# Patient Record
Sex: MALE | Race: WHITE | NOT HISPANIC OR LATINO | Employment: FULL TIME | URBAN - METROPOLITAN AREA
[De-identification: names, ages, dates, MRNs, and addresses within clinical notes are randomized per-mention and may not be internally consistent; named-entity substitution may affect disease eponyms.]

---

## 2018-04-11 ENCOUNTER — TRANSCRIBE ORDERS (OUTPATIENT)
Dept: URGENT CARE | Facility: MEDICAL CENTER | Age: 48
End: 2018-04-11

## 2018-04-11 ENCOUNTER — APPOINTMENT (OUTPATIENT)
Dept: RADIOLOGY | Facility: MEDICAL CENTER | Age: 48
End: 2018-04-11
Payer: COMMERCIAL

## 2018-04-11 DIAGNOSIS — R05.9 COUGH: Primary | ICD-10-CM

## 2018-04-11 DIAGNOSIS — R05.9 COUGH: ICD-10-CM

## 2018-04-11 PROCEDURE — 71046 X-RAY EXAM CHEST 2 VIEWS: CPT

## 2019-01-04 ENCOUNTER — OFFICE VISIT (OUTPATIENT)
Dept: URGENT CARE | Facility: MEDICAL CENTER | Age: 49
End: 2019-01-04
Payer: COMMERCIAL

## 2019-01-04 ENCOUNTER — APPOINTMENT (OUTPATIENT)
Dept: RADIOLOGY | Facility: MEDICAL CENTER | Age: 49
End: 2019-01-04
Payer: COMMERCIAL

## 2019-01-04 VITALS
SYSTOLIC BLOOD PRESSURE: 114 MMHG | WEIGHT: 236 LBS | TEMPERATURE: 98.6 F | DIASTOLIC BLOOD PRESSURE: 79 MMHG | HEART RATE: 67 BPM | BODY MASS INDEX: 33.79 KG/M2 | OXYGEN SATURATION: 98 % | HEIGHT: 70 IN | RESPIRATION RATE: 20 BRPM

## 2019-01-04 DIAGNOSIS — M25.552 LEFT HIP PAIN: Primary | ICD-10-CM

## 2019-01-04 DIAGNOSIS — M25.552 LEFT HIP PAIN: ICD-10-CM

## 2019-01-04 PROCEDURE — 73502 X-RAY EXAM HIP UNI 2-3 VIEWS: CPT

## 2019-01-04 PROCEDURE — 99213 OFFICE O/P EST LOW 20 MIN: CPT | Performed by: PHYSICIAN ASSISTANT

## 2019-01-04 RX ORDER — NAPROXEN 500 MG/1
500 TABLET ORAL 2 TIMES DAILY WITH MEALS
Qty: 20 TABLET | Refills: 0 | Status: SHIPPED | OUTPATIENT
Start: 2019-01-04 | End: 2019-01-14

## 2019-01-04 RX ORDER — METHOCARBAMOL 500 MG/1
500 TABLET, FILM COATED ORAL 4 TIMES DAILY
Qty: 20 TABLET | Refills: 0 | Status: SHIPPED | OUTPATIENT
Start: 2019-01-04 | End: 2019-01-09

## 2019-01-04 NOTE — PROGRESS NOTES
[facility]        NAME: Madison Lawton is a 53 y o  male  : 1970    MRN: [id number]  DATE: 2019  TIME: 1:23 PM    Assessment and Plan   Left hip pain [M25 [id number]]  1  Left hip pain  XR hip/pelv 2-3 vws left if performed         Patient Instructions     Left hip pain  Robaxin as directed (may become drowsy)  Naprosyn twice daily  Follow up with PCP in 3-5 days  Proceed to  ER if symptoms worsen  Chief Complaint     Chief Complaint   Patient presents with    Leg Pain         History of Present Illness       52 y/o male presents c/o pain to left upper hip x 2 days  Patient states he heard a pop x 2 to hip area  Last BM today (normal)  Denies h/o trauma, fever, chills, SOB, chest pain        Review of Systems   Review of Systems   Constitutional: Negative  HENT: Negative  Eyes: Negative  Respiratory: Negative  Negative for apnea, cough, choking, chest tightness, shortness of breath, wheezing and stridor  Cardiovascular: Negative  Negative for chest pain  Musculoskeletal: Positive for arthralgias  Current Medications     No current outpatient prescriptions on file  Current Allergies     Allergies as of 2019    (No Known Allergies)            The following portions of the patient's history were reviewed and updated as appropriate: allergies, current medications, past family history, past medical history, past social history, past surgical history and problem list      Past Medical History:   Diagnosis Date    DDD (degenerative disc disease), lumbosacral     Herniated disc, cervical     Torn ACL        Past Surgical History:   Procedure Laterality Date    NASAL SEPTUM SURGERY         Family History   Problem Relation Age of Onset    No Known Problems Mother          Medications have been verified          Objective   /79   Pulse 67   Temp 98 6 °F (37 °C) (Temporal)   Resp 20   Ht 5' 10" (1 778 m)   Wt 107 kg (236 lb)   SpO2 98%   BMI 33 86 kg/m²        Physical Exam     Physical Exam   Constitutional: He appears well-developed and well-nourished  No distress  Neck: Normal range of motion  Neck supple  Cardiovascular: Normal rate, regular rhythm, normal heart sounds and intact distal pulses  Pulmonary/Chest: Effort normal and breath sounds normal  No respiratory distress  He has no wheezes  He has no rales  He exhibits no tenderness  Abdominal: Soft  Bowel sounds are normal  He exhibits no distension and no mass  There is no tenderness  There is no rebound and no guarding  Hernia confirmed negative in the right inguinal area and confirmed negative in the left inguinal area  Genitourinary: Testes normal and penis normal    Musculoskeletal:        Left hip: He exhibits no swelling and no crepitus  Legs:  Lymphadenopathy:     He has no cervical adenopathy  Right: No inguinal adenopathy present  Left: No inguinal adenopathy present  Skin: He is not diaphoretic         Xray- no fx

## 2019-01-04 NOTE — PATIENT INSTRUCTIONS
Left hip pain  Robaxin as directed (may become drowsy)  Naprosyn twice daily  Follow up with PCP in 3-5 days  Proceed to  ER if symptoms worsen  Hip Sprain   WHAT YOU NEED TO KNOW:   A hip sprain is when a ligament in your hip is stretched or torn  Ligaments (tough tissue that connects bones) surround your hip and hold it in place  DISCHARGE INSTRUCTIONS:   Medicines:   · NSAIDs  help decrease swelling, pain, and fever  This medicine is available with or without a doctor's order  NSAIDs can cause stomach bleeding or kidney problems in certain people  If you take blood thinner medicine, always ask your healthcare provider if NSAIDs are safe for you  Always read the medicine label and follow directions  · Take your medicine as directed  Contact your healthcare provider if you think your medicine is not helping or if you have side effects  Tell him if you are allergic to any medicine  Keep a list of the medicines, vitamins, and herbs you take  Include the amounts, and when and why you take them  Bring the list or the pill bottles to follow-up visits  Carry your medicine list with you in case of an emergency  Follow up with your healthcare provider as directed:  Write down any questions you have so you remember to ask them in your follow-up visits  How to care for your hip sprain:   · Rest  your hip for 2 to 3 days after your injury  This will help decrease the risk of more damage to your hip  · Apply ice  on your hip for 15 to 20 minutes every hour or as directed  Use an ice pack, or put crushed ice in a plastic bag  Cover it with a towel  Ice helps prevent tissue damage and decreases swelling and pain  You may need to apply ice to your hip at least 4 to 8 times each day for the first 48 hours  How to exercise your hip:  After you rest your hip for about 3 days, your healthcare provider may want you to exercise the hip to decrease stiffness  He may also have you start physical therapy (PT)   A physical therapist teaches you light exercises to help decrease pain and swelling and improve hip movement  Once you are able to move your hip without pain, you will be taught exercises to improve your strength  If you have a severe sprain, you may need to wait 1 to 3 weeks before you exercise your hip  How to prevent another injury:  Ask your healthcare provider when you can return to your normal activities  The following can help decrease your risk for sprains:  · Do not exercise when you feel pain or you are tired  · Eat healthy foods  This can help keep your muscles strong  Ask about the best meal plan for you  · Exercise daily  Make sure you warm up and stretch before you exercise  · Wear equipment to protect yourself when you play sports  · Wear shoes that fit well to decrease your risk for falls  · Stop exercising and playing sports if your symptoms from a past injury return  Contact your healthcare provider if:   · You cannot stand on the leg on your injured side  · You have new or increased stiffness or trouble moving your injured hip  · You have new or increased numbness in the leg on your injured side  · You have increased swelling and pain in your hip  · The leg on your injured side looks bluish or pale  · You have questions or concerns about your condition or care  © 2017 [facility] Information is for End User's use only and may not be sold, redistributed or otherwise used for commercial purposes  All illustrations and images included in CareNotes® are the copyrighted property of [employer] , [employer]  or Reggie Roberto  The above information is an  only  It is not intended as medical advice for individual conditions or treatments  Talk to your doctor, nurse or pharmacist before following any medical regimen to see if it is safe and effective for you

## 2023-05-04 ENCOUNTER — TELEPHONE (OUTPATIENT)
Dept: GASTROENTEROLOGY | Facility: CLINIC | Age: 53
End: 2023-05-04

## 2023-05-04 ENCOUNTER — PREP FOR PROCEDURE (OUTPATIENT)
Dept: GASTROENTEROLOGY | Facility: CLINIC | Age: 53
End: 2023-05-04

## 2023-05-04 DIAGNOSIS — Z12.11 SCREENING FOR COLON CANCER: Primary | ICD-10-CM

## 2023-05-04 NOTE — TELEPHONE ENCOUNTER
05/04/23  Screened by: Alon Moore    Referring Provider Davida    Pre- Screening: There is no height or weight on file to calculate BMI  Has patient been referred for a routine screening Colonoscopy? yes  Is the patient between 39-70 years old? no      Previous Colonoscopy no   If yes:    Date:     Facility:     Reason:       SCHEDULING STAFF: If the patient is between 45yrs-49yrs, please advise patient to confirm benefits/coverage with their insurance company for a routine screening colonoscopy, some insurance carriers will only cover at [address] or older  If the patient is over 66years old, please schedule an office visit  Does the patient want to see a Gastroenterologist prior to their procedure OR are they having any GI symptoms? no    Has the patient been hospitalized or had abdominal surgery in the past 6 months? no    Does the patient use supplemental oxygen? no    Does the patient take Coumadin, Lovenox, Plavix, Elliquis, Xarelto, or other blood thinning medication? no    Has the patient had a stroke, cardiac event, or stent placed in the past year? no    SCHEDULING STAFF: If patient answers NO to above questions, then schedule procedure  If patient answers YES to above questions, then schedule office appointment  Passed oa    If patient is between 45yrs - 49yrs, please advise patient that we will have to confirm benefits & coverage with their insurance company for a routine screening colonoscopy

## 2023-05-04 NOTE — TELEPHONE ENCOUNTER
Scheduled date of colonoscopy (as of today): 8/28/23    Physician performing colonoscopy: Fredy    Location of colonoscopy: [facility]    PASSED OA   SCREENING

## 2023-08-01 DIAGNOSIS — Z12.11 SCREENING FOR COLON CANCER: Primary | ICD-10-CM

## 2023-08-01 NOTE — TELEPHONE ENCOUNTER
Scheduled date of colonoscopy (as of today): 10/06/2023    Physician performing colonoscopy: Dr. Cole Lopez    Location of colonoscopy: [place]    Bowel prep reviewed with patient: Dariel    Instructions reviewed with patient by: Bee Kirkland    Clearances: None

## 2023-09-12 ENCOUNTER — TELEPHONE (OUTPATIENT)
Dept: GASTROENTEROLOGY | Facility: CLINIC | Age: 53
End: 2023-09-12

## 2023-10-16 ENCOUNTER — TELEPHONE (OUTPATIENT)
Dept: GASTROENTEROLOGY | Facility: CLINIC | Age: 53
End: 2023-10-16

## 2023-10-24 ENCOUNTER — TELEPHONE (OUTPATIENT)
Dept: GASTROENTEROLOGY | Facility: CLINIC | Age: 53
End: 2023-10-24

## 2023-10-24 DIAGNOSIS — Z12.11 SCREENING FOR COLON CANCER: ICD-10-CM

## 2023-10-24 NOTE — TELEPHONE ENCOUNTER
Patients GI provider:  Dr. Ameya Weir    Number to return call: [phone]    Reason for call: Pt calling to r/s procedure that was supposed to be on 10/27. Pt states he is not feeling well. R/s'd patient to 12/1. Pt needs golytely resent to pharmacy.     Scheduled procedure/appointment date if applicable: procedure 45/2